# Patient Record
Sex: FEMALE | Race: WHITE | Employment: OTHER | URBAN - METROPOLITAN AREA
[De-identification: names, ages, dates, MRNs, and addresses within clinical notes are randomized per-mention and may not be internally consistent; named-entity substitution may affect disease eponyms.]

---

## 2018-10-30 ENCOUNTER — APPOINTMENT (OUTPATIENT)
Dept: PHYSICAL THERAPY | Age: 43
End: 2018-10-30
Payer: OTHER GOVERNMENT

## 2018-11-05 ENCOUNTER — HOSPITAL ENCOUNTER (OUTPATIENT)
Dept: PHYSICAL THERAPY | Age: 43
Discharge: HOME | End: 2018-11-05
Payer: OTHER GOVERNMENT

## 2018-11-05 PROCEDURE — 97112 NEUROMUSCULAR REEDUCATION: CPT

## 2018-11-05 PROCEDURE — 97162 PT EVAL MOD COMPLEX 30 MIN: CPT

## 2018-11-05 NOTE — PROGRESS NOTES
[facility]  [address], [address]  Ph [phone]  Fx [phone]    Plan of Care/ Statement of Necessity for Physical Therapy Services    Patient name: Valarie Kee Start of Care: 2018   Referral source: Farhat Cunningham CNM : 1975    Medical Diagnosis: Stress incontinence (female) (male) [N39.3]   Onset Date:May 2017     Treatment Diagnosis: Stress incontinence (female) (male) [N39.3]   Prior Hospitalization: see medical history Provider#: [id number]   Medications: Verified on Patient summary List    Comorbidities: HTN, melanoma   Prior Level of Function: patient was independent with ADLs and activities however now limited secondary to sudden urgency             The Plan of Care and following information is based on the information from the initial evaluation. Assessment/ key information:   Patient is 43yo female who presents with symptoms of mixed urinary incontinence which worsens when patient has any urgency or hears/sees triggers and stress activities. Pt reports 2X leakage episodes per day. She has moderate leakage amount wetting through underwear. Pt denies use of pad protection at this time. Voiding frequency of >10X/day. Nocturnal frequency reported as 2X/night. Patient denies any pain or bowel dysfunction. Evaluation of pelvic floor muscles reveals decreased pelvic floor muscle awareness, decreased motor performance 2/5, decreased endurance, and PERF score of 16/35=2/3/4/7. Hypertonicity noted bilaterally with poor release following contraction. SEMG of pelvic floor muscles for assessment upon f/u visit. Pt may benefit from pelvic floor physical therapy to address pelvic floor dysfunction with symptoms of urinary incontinence.     Evaluation Complexity History MEDIUM  Complexity : 1-2 comorbidities / personal factors will impact the outcome/ POC ; Examination MEDIUM Complexity : 3 Standardized tests and measures addressing body structure, function, activity limitation and / or participation in recreation  ;Presentation MEDIUM Complexity : Evolving with changing characteristics  ; Clinical Decision Making MEDIUM Complexity : FOTO score of 26-74  Overall Complexity Rating: MEDIUM    Problem List: Pelvic pain/dysfunction, Decreased pelvic floor mm awareness, Decreased pelvic floor mm strength, Use of accessory muscles, Improper voiding habits, Hypertonus of pelvic floor, Urinary urgency and Othercolorectal issues     Treatment Plan may include any combination of the following:   Therapeutic exercise, Urge suppression techniques, Neuromuscular re-education, Manual therapy, Physical agent/modality, Patient education and OtherSEMG/biofeedback, electrical stimulatin, therapeutic activities   Patient / Family readiness to learn indicated by: asking questions, trying to perform skills and interest    Persons(s) to be included in education: patient (P)    Barriers to Learning/Limitations: None    Patient Goal (s): strengthen my bladder wall so I don't have accidents and can find a way to hold it when facilitiies are not readily available    Rehabilitation Potential: good    Short Term Goals: To be accomplished in 4 weeks:  1. Patient will demonstrate accurate performance of home exercise program/pelvic floor contractions as adjunct to physical therapy clinic visits to promote healthy lifestyle and improved quality of life. Status @ Eval: initiated PFM contractions   2. Patient will Complete Bladder Diary for use in patient education and goal setting. Status @ Eval: provided and review on proper use   3. Patient will have decreased leakage episodes to <2X/day for increased quality of life. Status @ Eval: 2X/day    Long Term Goals: To be accomplished in 8  weeks:  1. Patient will demonstrate independence in HEP for maintenance of pelvic floor program and improved quality of life. Status @ Eval: initiated   2.  Patient will have decreased leakage episodes to </=2 per week for increased quality of life. Status @ Eval: 2X/day   3. Patient will have increased pelvic floor muscle motor performance by 1/2 to 1 grade for improved urinary continence and quality of life. Status @ Eval: 2/5 PERF 16/35   4. Patient will have FOTO Urinary Problem score change of 14 points or more indicating improvement in function for icreased quality of life. Status @ Eval: 45     Frequency / Duration: Patient to be seen 1 times per week for 8 weeks. Patient/ Caregiver education and instruction: Diagnosis, prognosis, Other POC, HEP, PFM contractions/NMR      Kesha Green 11/5/2018 9:11 AM    ________________________________________________________________________    I certify that the above Therapy Services are being furnished while the patient is under my care. I agree with the treatment plan and certify that this therapy is necessary.     Physician's Signature:____________________  Date:____________Time:____________    Please sign and return to [facility]  [address], [address]  Ph [phone]  Fx [phone]

## 2018-11-05 NOTE — PROGRESS NOTES
PF EVALUATION/TREATMENT NOTE     Patient Name: Valarie Kee  Date:2018  : 1975  [x]  Patient  Verified  Payor:  / Plan: [employer] [facility] [address] AND DEPENDENTS / Product Type:  /    In time: 09:00  Out time:10:00  Total Treatment Time (min): 60  Visit #: 1 of 8    Treatment Area: [x] Pelvic Floor     [] Other:    SUBJECTIVE  Any medication changes, allergies to medications, adverse drug reactions, diagnosis change, or new procedure performed?: [x] No    [] Yes (see summary sheet for update)    Pain Level : 0/10  OBJECTIVE     Pelvic Floor Dysfunction Evaluation    Musculoskeletal Screen: N/A    Skin Integrity:  [x] Healthy [] Red  [] Labia Atrophy [] Fragile    Sensation: [x] Intact [] Diminished:    Muscle Bulk: [x] Symmetrical  [] Well-developed [] Atrophied:  []L   []R   []B    Prolapse:N/A [] Cystocele:   [] Rectocele:    PERF Score (Performance/Endurance/Repetitions/Flicks)   P: 2 E:3 R: 4 F: 7 Total:16/35    Accessory Muscle Use: none    Patient has failed previous pelvic floor muscle training? [] Yes    [] No      25 min [x]Eval                  []Re-Eval     35 min Neuromuscular Re-education:  []  See flow sheet :   [x]  Pelvic floor strengthening                 []  Pelvic floor downtraining  [x]  Quality pelvic floor contractions       []  Relaxation techniques  []  Urge suppression exercises  []  Other:PFM contractions and bladder diary   Rationale: increase ROM, increase strength and improve coordination  to improve the patients ability to increase PFM contractions     Other Objective/Functional Measures:    FOTO: 45, change of 14    Pain Level (0-10 scale) post treatment: 0/10    ASSESSMENT:     [x]  See Plan of Care  []  See progress note/recertification  []  See Discharge Summary         Progress towards goals / Updated goals:  SEE POC    PLAN  []  Upgrade activities as tolerated     [x]  Continue plan of care  []  Update interventions per flow sheet       [] Discharge due to:_  []  Other:_      Maya Calloway 11/5/2018  9:11 AM

## 2018-11-12 ENCOUNTER — APPOINTMENT (OUTPATIENT)
Dept: PHYSICAL THERAPY | Age: 43
End: 2018-11-12
Payer: OTHER GOVERNMENT

## 2018-11-20 ENCOUNTER — HOSPITAL ENCOUNTER (OUTPATIENT)
Dept: PHYSICAL THERAPY | Age: 43
Discharge: HOME | End: 2018-11-20
Payer: OTHER GOVERNMENT

## 2018-11-20 PROCEDURE — 97112 NEUROMUSCULAR REEDUCATION: CPT

## 2018-11-20 NOTE — PROGRESS NOTES
PF DAILY TREATMENT NOTE 10-18    Patient Name: Grady Dobbins  Date:2018  : 1975  [x]  Patient  Verified  Payor: [facility] / Plan: [employer] [facility] [address] AND DEPENDENTS / Product Type: Yuri Hylan /    In time:04:34  Out time:5:15  Total Treatment Time (min): 41  Visit #: 2 of 8    Medicare/BCBS Only   Total Timed Codes (min):   1:1 Treatment Time:       Treatment Area: [x] Pelvic Floor     [] Other:    SUBJECTIVE  Pain Level (0-10 scale): 0/10  Any medication changes, allergies to medications, adverse drug reactions, diagnosis change, or new procedure performed?: [x] No    [x] Yes (see summary sheet for update)  Subjective functional status/changes:   [] No changes reported      OBJECTIVE    41 min Neuromuscular Re-education:  []  See flow sheet :   []  Pelvic floor strengthening                 []  Pelvic floor downtraining  []  Quality pelvic floor contractions       []  Relaxation techniques  []  Urge suppression exercises  []  Other:reviewed bladder log, bladder irritants, and instructed on bladder management techniques with focus on voiding schedule of 2 hours, avoidance of irritants, and no food/water 3 hours prior to sleeping. Downtraining with biofeedback performed to ensure proper relaxation/eccentric lengthening between PFM contractions   Rationale: increase ROM, increase strength and improve coordination  to improve the patients ability to     Other Objective/Functional Measures:   Net rise: 10.5 average  Resting: 3.0 average  4 second hold 15 second rest 10 repetitions     Pain Level (0-10 scale) post treatment: 0/10    ASSESSMENT/Changes in Function: Patient demonstrated good form with biofeedback utilizing sensors focusing on downtraining aspect with utilizing PFM contractions to increase fatigue and elicit improved release.      []  Decrease # of leaks   [] No change []  Improving [] Resolved     []  Decrease hypertonus [] No change []  Improving [] Resolved     []  Increase void interval [] No change []  Improving [] Resolved     [x]  Increase PF strength [] No change [x]  Improving [] Resolved     []  Increase PF endurance [] No change []  Improving [] Resolved     []  Increase endurance [] No change []  Improving [] Resolved     []  Decrease # of pads [] No change []  Improving [] Resolved     []  Decrease pain [] No change []  Improving [] Resolved     [x]  Increased coordination [] No change [x]  Improving [] Resolved     []  Increased Bowel Frequency [] No change []  Improving [] Resolved       Patient will continue to benefit from skilled PT services to modify and progress therapeutic interventions, address functional mobility deficits, address ROM deficits, address strength deficits, analyze and address soft tissue restrictions, analyze and cue movement patterns and analyze and modify body mechanics/ergonomics to attain remaining goals. []  See Plan of Care  []  See progress note/recertification  []  See Discharge Summary         Progress towards goals / Updated goals:  Short Term Goals: To be accomplished in 4 weeks:  1. Patient will demonstrate accurate performance of home exercise program/pelvic floor contractions as adjunct to physical therapy clinic visits to promote healthy lifestyle and improved quality of life. Status @ Eval: initiated PFM contractions   2. Patient will Complete Bladder Diary for use in patient education and goal setting. Status @ Eval: provided and review on proper use   3. Patient will have decreased leakage episodes to <2X/day for increased quality of life. Status @ Eval: 2X/day     Long Term Goals: To be accomplished in 8  weeks:  1. Patient will demonstrate independence in HEP for maintenance of pelvic floor program and improved quality of life. Status @ Eval: initiated   2. Patient will have decreased leakage episodes to </=2 per week for increased quality of life. Status @ Eval: 2X/day   3.  Patient will have increased pelvic floor muscle motor performance by 1/2 to 1 grade for improved urinary continence and quality of life. Status @ Eval: 2/5 PERF 16/35   4. Patient will have FOTO Urinary Problem score change of 14 points or more indicating improvement in function for icreased quality of life.    Status @ Eval: 39     PLAN  []  Upgrade activities as tolerated     [x]  Continue plan of care  []  Update interventions per flow sheet       []  Discharge due to:_  []  Other:_      Apurva Huerta 11/20/2018  4:34 PM    Future Appointments   Date Time Provider Gene Rodgers   11/26/2018 11:30 AM Tiburcio Granger [facility]   12/3/2018 10:00 AM [facility]   12/10/2018 10:00 AM [facility]   12/28/2018 12:45 PM [facility]   12/31/2018 10:00 AM Tiburcio Granger [facility]

## 2018-11-26 ENCOUNTER — HOSPITAL ENCOUNTER (OUTPATIENT)
Dept: PHYSICAL THERAPY | Age: 43
Discharge: HOME | End: 2018-11-26
Payer: OTHER GOVERNMENT

## 2018-11-26 PROCEDURE — 97110 THERAPEUTIC EXERCISES: CPT

## 2018-11-26 NOTE — PROGRESS NOTES
PF DAILY TREATMENT NOTE 10-18    Patient Name: Dejah Barron  Date:2018  : 1975  [x]  Patient  Verified  Payor:  / Plan: [facility]  ACTIVE DUTY AND DEPENDENTS / Product Type: Dewitte Peto /    In time:11:30  Out time 12:10  Total Treatment Time (min): 40  Visit #: 3 of 8    Medicare/BCBS Only   Total Timed Codes (min):   1:1 Treatment Time:       Treatment Area: [x] Pelvic Floor     [] Other:    SUBJECTIVE  Pain Level (0-10 scale): 0/10  Any medication changes, allergies to medications, adverse drug reactions, diagnosis change, or new procedure performed?: [x] No    [] Yes (see summary sheet for update)  Subjective functional status/changes:   [] No changes reported      OBJECTIVE    40 min Therapeutic Exercise:  [] See flow sheet :   []  Pelvic floor strengthening                 []  Pelvic floor downtraining  []  Quality pelvic floor contractions       []  Relaxation techniques  []  Urge suppression exercises  []  Other:  Rationale: increase ROM and increase strength  to improve the patients ability to increase PFM extensibility     Other Objective/Functional Measures:     Pain Level (0-10 scale) post treatment: 0/10    ASSESSMENT/Changes in Function:   Patient tolerated exercises fair with no reports of increased pain. PT progressed HEP based off performance focusing on downtraining and uptraining components.      []  Decrease # of leaks   [] No change []  Improving [] Resolved     []  Decrease hypertonus [] No change []  Improving [] Resolved     []  Increase void interval [] No change []  Improving [] Resolved     []  Increase PF strength [] No change []  Improving [] Resolved     []  Increase PF endurance [] No change []  Improving [] Resolved     []  Increase endurance [] No change []  Improving [] Resolved     []  Decrease # of pads [] No change []  Improving [] Resolved     []  Decrease pain [] No change []  Improving [] Resolved     []  Increased coordination [] No change [] Improving [] Resolved     []  Increased Bowel Frequency [] No change []  Improving [] Resolved       Patient will continue to benefit from skilled PT services to modify and progress therapeutic interventions, address functional mobility deficits, address ROM deficits, address strength deficits, analyze and address soft tissue restrictions, analyze and cue movement patterns, analyze and modify body mechanics/ergonomics and assess and modify postural abnormalities to attain remaining goals. []  See Plan of Care  []  See progress note/recertification  []  See Discharge Summary         Progress towards goals / Updated goals:  Short Term Goals: To be accomplished in 4 weeks:  1. Patient will demonstrate accurate performance of home exercise program/pelvic floor contractions as adjunct to physical therapy clinic visits to promote healthy lifestyle and improved quality of life. Status @ Eval: initiated PFM contractions   Current: progressed HEP   2. Patient will Complete Bladder Diary for use in patient education and goal setting. Status @ Eval: provided and review on proper use   3. Patient will have decreased leakage episodes to <2X/day for increased quality of life. Status @ Eval: 2X/day     Long Term Goals: To be accomplished in 8  weeks:  1. Patient will demonstrate independence in HEP for maintenance of pelvic floor program and improved quality of life. Status @ Eval: initiated   2. Patient will have decreased leakage episodes to </=2 per week for increased quality of life. Status @ Eval: 2X/day   3. Patient will have increased pelvic floor muscle motor performance by 1/2 to 1 grade for improved urinary continence and quality of life. Status @ Eval: 2/5 PERF 16/35   4. Patient will have FOTO Urinary Problem score change of 14 points or more indicating improvement in function for icreased quality of life.    Status @ Eval: 45     PLAN  []  Upgrade activities as tolerated     [x]  Continue plan of care  [] Update interventions per flow sheet       []  Discharge due to:_  []  Other:_      Pérez Florentino 11/26/2018  11:38 AM    Future Appointments   Date Time Provider Gene Rodgers   12/3/2018 10:00 AM [facility]   12/10/2018 10:00 AM [facility]   [facility]   12/31/2018 10:00 AM [facility]

## 2018-12-03 ENCOUNTER — APPOINTMENT (OUTPATIENT)
Dept: PHYSICAL THERAPY | Age: 43
End: 2018-12-03
Payer: OTHER GOVERNMENT

## 2018-12-10 ENCOUNTER — HOSPITAL ENCOUNTER (OUTPATIENT)
Dept: PHYSICAL THERAPY | Age: 43
Discharge: HOME | End: 2018-12-10
Payer: OTHER GOVERNMENT

## 2018-12-10 PROCEDURE — 97140 MANUAL THERAPY 1/> REGIONS: CPT

## 2018-12-10 PROCEDURE — 97112 NEUROMUSCULAR REEDUCATION: CPT

## 2018-12-10 NOTE — PROGRESS NOTES
PF DAILY TREATMENT NOTE 10-18    Patient Name: Jessica Pearson  Date:12/10/2018  : 1975  [x]  Patient  Verified  Payor:  / Plan: [facility]  ACTIVE DUTY AND DEPENDENTS / Product Type:  /    In time:10:08  Out time:10:55  Total Treatment Time (min): 47  Visit #: 4 of 8    Medicare/BCBS Only   Total Timed Codes (min):   1:1 Treatment Time:       Treatment Area: [] Pelvic Floor     [] Other:    SUBJECTIVE  Pain Level (0-10 scale): 0/10  Any medication changes, allergies to medications, adverse drug reactions, diagnosis change, or new procedure performed?: [x] No    [] Yes (see summary sheet for update)  Subjective functional status/changes:   [] No changes reported  Patient reports she has been compliant with everything including kegels however still has same urgency issues. She has decreased nocturia to 1X/night with schedule change in medication and avoidance of food/water 2-3 hours prior to sleeping which PT and her discussed. OBJECTIVE    15 min Neuromuscular Re-education:  []  See flow sheet :   []  Pelvic floor strengthening                 []  Pelvic floor downtraining  []  Quality pelvic floor contractions       []  Relaxation techniques  [x]  Urge suppression exercises  [x]  Other: bladder management including voiding schedule reduced to 1 hour, decrease in water intake to 110 ounces (1/2 of body weight), and urge suppresion technique  Rationale: increase awarness  to improve the patients ability to decrease PFM urgency and reduce leakages     32 min Manual Therapy:  MFR/trigger point release with strain-counter strain technique for levator ani    Rationale: decrease pain, increase ROM, increase tissue extensibility and decrease trigger points to increase PFM motility     Other Objective/Functional Measures:    FOTO: next visit     Pain Level (0-10 scale) post treatment: 0/10    ASSESSMENT/Changes in Function:   Patient demonstrate continued compliance with improved noted endurance and anaerobic capacity of muscle and improve general strength. However, patient continues to have poor motility of PFM and relaxation which could limit effectiveness of muscle to contract and relax through full ROM to ensure decreased urgency sensation and successful emptying of bladder. Patient will continue to benefit from skilled PT services to modify and progress therapeutic interventions, address functional mobility deficits, address ROM deficits, address strength deficits, analyze and address soft tissue restrictions, analyze and cue movement patterns, analyze and modify body mechanics/ergonomics and assess and modify postural abnormalities to attain remaining goals. [x]  Decrease # of leaks   [x] No change []  Improving [] Resolved     [x]  Decrease hypertonus [x] No change []  Improving [] Resolved     [x]  Increase void interval [x] No change []  Improving [] Resolved     [x]  Increase PF strength [] No change [x]  Improving [] Resolved     [x]  Increase PF endurance [] No change [x]  Improving [] Resolved     []  Increase endurance [] No change []  Improving [] Resolved     []  Decrease # of pads [] No change []  Improving [] Resolved     []  Decrease pain [] No change []  Improving [] Resolved     [x]  Increased coordination [] No change [x]  Improving [] Resolved     []  Increased Bowel Frequency [] No change []  Improving [] Resolved     []  See Plan of Care  [x]  See progress note/recertification  []  See Discharge Summary         Progress towards goals / Updated goals:  Short Term Goals: To be accomplished in 4 weeks:  1. Patient will demonstrate accurate performance of home exercise program/pelvic floor contractions as adjunct to physical therapy clinic visits to promote healthy lifestyle and improved quality of life. Status @ Eval: initiated PFM contractions   Current: progressed HEP-patient compliant MET   2.  Patient will Complete Bladder Diary for use in patient education and goal setting. Status @ Eval: provided and review on proper use  Current: reviewed at 2nd visit-MET   3. Patient will have decreased leakage episodes to <2X/day for increased quality of life. Status @ Eval: 2X/day  Current: no change       Long Term Goals: To be accomplished in 8  weeks:  1. Patient will demonstrate independence in HEP for maintenance of pelvic floor program and improved quality of life. Status @ Eval: initiated   Current: patient compliant with HEP   2. Patient will have decreased leakage episodes to </=2 per week for increased quality of life. Status @ Eval: 2X/day  Current: no change    3. Patient will have increased pelvic floor muscle motor performance by 1/2 to 1 grade for improved urinary continence and quality of life. Status @ Eval: 2/5 PERF 16/35   Current: 2+/5, 10, 5, 5=22/35 progression-MET  4. Patient will have FOTO Urinary Problem score change of 14 points or more indicating improvement in function for icreased quality of life.    Status @ Eval: 45  Current: to be performed at 5th visit     PLAN  []  Upgrade activities as tolerated     [x]  Continue plan of care  []  Update interventions per flow sheet       []  Discharge due to:_  []  Other:_      Alysia Chapman 12/10/2018  10:09 AM    Future Appointments   Date Time Provider Gene Rodgers   12/28/2018 12:45 PM [facility]   12/31/2018 10:00 AM [facility]

## 2018-12-26 ENCOUNTER — APPOINTMENT (OUTPATIENT)
Dept: PHYSICAL THERAPY | Age: 43
End: 2018-12-26
Payer: OTHER GOVERNMENT

## 2018-12-28 ENCOUNTER — HOSPITAL ENCOUNTER (OUTPATIENT)
Dept: PHYSICAL THERAPY | Age: 43
Discharge: HOME | End: 2018-12-28
Payer: OTHER GOVERNMENT

## 2018-12-28 PROCEDURE — 97112 NEUROMUSCULAR REEDUCATION: CPT

## 2018-12-28 NOTE — PROGRESS NOTES
[facility]  [address] Shaka Valencia, [address] [facility]  Ph [phone]  Fx [phone]    Physical Therapy Discharge Summary    Patient name: Deangelo Medina Start of Care: 2018   Referral source: Chani Swan CNM : 1975   Medical/Treatment Diagnosis: Stress incontinence (female) (male) [N39.3] Onset Date:May 2017     Prior Hospitalization: see medical history Provider#: [id number]   Medications: Verified on Patient Summary List    Comorbidities: HTN, melanoma   Prior Level of Function: patient was independent with ADLs and activities however now limited secondary to sudden urgenc    Visits from Start of Care: 5    Missed Visits: 1    Reporting Period : 12/10/2018 to 2018    Summary of Care:    Short Term Goals: To be accomplished in 4 weeks:  1. Patient will demonstrate accurate performance of home exercise program/pelvic floor contractions as adjunct to physical therapy clinic visits to promote healthy lifestyle and improved quality of life. Status @ Eval: initiated PFM contractions   Current: progressed HEP-patient compliant MET   2. Patient will Complete Bladder Diary for use in patient education and goal setting. Status @ Eval: provided and review on proper use  Current: reviewed at 2nd visit-MET   3. Patient will have decreased leakage episodes to <2X/day for increased quality of life. Status @ Eval: 2X/day  Current: no change-NOT MET        Long Term Goals: To be accomplished in 8  weeks:  1. Patient will demonstrate independence in HEP for maintenance of pelvic floor program and improved quality of life. Status @ Eval: initiated   Current: patient independent with HEP-MET   2. Patient will have decreased leakage episodes to </=2 per week for increased quality of life.   Status @ Eval: 2X/day  Current: no change-NOT MET    3. Patient will have increased pelvic floor muscle motor performance by 1/2 to 1 grade for improved urinary continence and quality of life.  Status @ Eval: 2/5 PERF 16/35   Current: 2+/5, 10, 5, 5=22/35 progression-MET  4. Patient will have FOTO Urinary Problem score change of 14 points or more indicating improvement in function for icreased quality of life. Status @ Eval: 45  Current: 52, change of 7-NOT MET        ASSESSMENT/RECOMMENDATIONS:Patient making no progress with leakage amount or occurrence with stress activities. Patient has demonstrated compliance with HEP and bladder management techniques discussed with PT to improve bladder function and reduce possibility of leakages however no change. PT reviewed HEP today with focus on uptraining aspects and bladder management techniques for patient to continue doing independently.  Patient will be discharged a this time secondary to poor progress shown and recommend follow up with physician at this time.          [x]Discontinue therapy: []Patient has reached or is progressing toward set goals      []Patient is non-compliant or has abdicated      [x]Due to lack of appreciable progress towards set goals    Mor Ruvalcaba 12/28/2018 1:22 PM

## 2018-12-28 NOTE — PROGRESS NOTES
PF DAILY TREATMENT NOTE 10-18    Patient Name: Zahira Barraza  Date:2018  : 1975  [x]  Patient  Verified  Payor: [facility] / Plan: [employer] [address] AND DEPENDENTS / Product Type: Diego Stammer /    In time:12:40  Out time:1:14  Total Treatment Time (min): 34  Visit #: 5 of 8    Medicare/BCBS Only   Total Timed Codes (min):   1:1 Treatment Time:       Treatment Area: [x] Pelvic Floor     [] Other:    SUBJECTIVE  Pain Level (0-10 scale): 0/10  Any medication changes, allergies to medications, adverse drug reactions, diagnosis change, or new procedure performed?: [x] No    [] Yes (see summary sheet for update)  Subjective functional status/changes:   [] No changes reported  Patient feels she has noticed not change with being compliant with HEP since beginning therapy and wanted to discuss possible discharge with PT.     OBJECTIVE    34 min Neuromuscular Re-education:  []  See flow sheet :   [x]  Pelvic floor strengthening                 [x]  Pelvic floor downtraining  [x]  Quality pelvic floor contractions       [x]  Relaxation techniques  []  Urge suppression exercises  [x]  Other:review of HEP including PFM contractions with progression and addition of anaerobic capacity of PFM added to HEP, bladder management techniques reviewed including changes in water intake, voiding schedule of 1 hour to increase to 2 hours, and avoidance of bladder irritants  Rationale: increase ROM, increase strength, improve coordination and increase awareness  to improve the patients ability to have decreased leakages     Other Objective/Functional Measures:     Pain Level (0-10 scale) post treatment: 0/10    ASSESSMENT/Changes in Function:   Patient making no progress with leakage amount or occurrence with stress activities. Patient has demonstrated compliance with HEP and bladder management techniques discussed with PT to improve bladder function and reduce possibility of leakages however no change.  PT reviewed HEP today with focus on uptraining aspects and bladder management techniques for patient to continue doing independently. Patient will be discharged a this time secondary to poor progress shown and recommend follow up with physician at this time. []  Decrease # of leaks   [] No change []  Improving [] Resolved     []  Decrease hypertonus [] No change []  Improving [] Resolved     []  Increase void interval [] No change []  Improving [] Resolved     []  Increase PF strength [] No change []  Improving [] Resolved     []  Increase PF endurance [] No change []  Improving [] Resolved     []  Increase endurance [] No change []  Improving [] Resolved     []  Decrease # of pads [] No change []  Improving [] Resolved     []  Decrease pain [] No change []  Improving [] Resolved     []  Increased coordination [] No change []  Improving [] Resolved     []  Increased Bowel Frequency [] No change []  Improving [] Resolved     []  See Plan of Care  []  See progress note/recertification  []  See Discharge Summary         Progress towards goals / Updated goals:  Short Term Goals: To be accomplished in 4 weeks:  1. Patient will demonstrate accurate performance of home exercise program/pelvic floor contractions as adjunct to physical therapy clinic visits to promote healthy lifestyle and improved quality of life. Status @ Eval: initiated PFM contractions   Current: progressed HEP-patient compliant MET   2. Patient will Complete Bladder Diary for use in patient education and goal setting. Status @ Eval: provided and review on proper use  Current: reviewed at 2nd visit-MET   3. Patient will have decreased leakage episodes to <2X/day for increased quality of life. Status @ Eval: 2X/day  Current: no change-NOT MET        Long Term Goals: To be accomplished in 8  weeks:  1. Patient will demonstrate independence in HEP for maintenance of pelvic floor program and improved quality of life.   Status @ Eval: initiated   Current: patient independent with HEP-MET   2. Patient will have decreased leakage episodes to </=2 per week for increased quality of life. Status @ Eval: 2X/day  Current: no change-NOT MET    3. Patient will have increased pelvic floor muscle motor performance by 1/2 to 1 grade for improved urinary continence and quality of life. Status @ Eval: 2/5 PERF 16/35   Current: 2+/5, 10, 5, 5=22/35 progression-MET  4. Patient will have FOTO Urinary Problem score change of 14 points or more indicating improvement in function for icreased quality of life.    Status @ Eval: 45  Current: 52, change of 7-NOT MET    PLAN  []  Upgrade activities as tolerated     []  Continue plan of care  []  Update interventions per flow sheet       [x]  Discharge due to:_plateau   []  Other:_      Sukumar Arzola 12/28/2018  12:43 PM    Future Appointments   Date Time Provider Gene Rodgers   12/28/2018 12:45 PM Adam Aragon [facility]   12/31/2018 10:00 AM Deepika Slater [facility]

## 2018-12-31 ENCOUNTER — APPOINTMENT (OUTPATIENT)
Dept: PHYSICAL THERAPY | Age: 43
End: 2018-12-31
Payer: OTHER GOVERNMENT